# Patient Record
(demographics unavailable — no encounter records)

---

## 2024-11-20 NOTE — SOCIAL HISTORY
[House] : [unfilled] lives in a house  [None] : none [] :  [FreeTextEntry1] : Lives with spouse [employer]  [Smokers in Household] : there are no smokers in the home

## 2024-11-20 NOTE — HISTORY OF PRESENT ILLNESS
[de-identified] : Patient reports 1 month of intermittent itchy rash - the rash will last for 2-3 days - resolves with dark marks on her skin.   She has been taking prn Benadryl with some improvement - she saw PA at her PCP office - she was treated with prednisone 40mg QD x 5 D and Benadryl but her symptoms recurred off medication.    No skin biopsy performed.   No history of food allergies - no asthma history.   Born in [place]   Feels well otherwise.

## 2024-11-20 NOTE — REVIEW OF SYSTEMS
[Nl] : Genitourinary [FreeTextEntry5] : HTN - elevated cholesterol [FreeTextEntry8] : carpal tunnel of wrists

## 2024-11-20 NOTE — ASSESSMENT
[FreeTextEntry1] : Urticaria with persistent hyperpigmented patches:  Allegra 180 mg QAM  Xyzal 5 mg QHS  Patient referred to dermatology for possible skin biopsy - rule out urticarial vasculitis

## 2024-11-20 NOTE — PHYSICAL EXAM
[Alert] : alert [Well Nourished] : well nourished [Healthy Appearance] : healthy appearance [No Acute Distress] : no acute distress [Well Developed] : well developed [Normal Voice/Communication] : normal voice communication [No Neck Mass] : no neck mass was observed [No LAD] : no lymphadenopathy [Normal Rate and Effort] : normal respiratory rhythm and effort [No Crackles] : no crackles [No Retractions] : no retractions [Normal Rate] : heart rate was normal  [Normal S1, S2] : normal S1 and S2 [No murmur] : no murmur [Regular Rhythm] : with a regular rhythm [Normal Cervical Lymph Nodes] : cervical [Patches] : ~M patches present [Normal Mood] : mood was normal [Judgment and Insight Age Appropriate] : judgement and insight is age appropriate [Wheezing] : no wheezing was heard [de-identified] : hyperpigmented patches on extremities

## 2024-12-10 NOTE — PHYSICAL EXAM
[FreeTextEntry3] : General: well appearing person in nad, alert, pleasant Focused Skin Exam per patient preference: B/l arms with erythematous papule with central pucta and scattered hyperpigmented macules Right leg with erythematous papule with centra punctum x2 All fingerweb spaces clear

## 2024-12-10 NOTE — HISTORY OF PRESENT ILLNESS
[FreeTextEntry1] : NPV- hives [de-identified] : 53yoF new patient here for evaluation of hives x 6 week or so. Saw allergist about 2 weeks ago, still getting very itchy bumps on arms. Was prescribed xyzal and allegra. Food allergy testing was negative. She traveled to her [facility] house out of [facility] recently and during that trip got no new itchy bumps. Her  lives with her, he has no rash. Her tenants upstairs recently had bedbugs, she had  look at her house/bed and they did not find bugs. She does not have pets but her daughter and friend have dogs.

## 2024-12-10 NOTE — ASSESSMENT
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 Higher suspicion for arthropod assault. [place] (outside [place]), stopped getting lesions - discussed to try to identify if any exposure to insects or bugs - start clobetasol ointment to AA BID x 2-3 weeks prn itch, not for face, groin, armpits, SED - if not improved at NV, may consider bx  RTC 3 weeks